# Patient Record
Sex: FEMALE | Race: WHITE | Employment: OTHER | URBAN - METROPOLITAN AREA
[De-identification: names, ages, dates, MRNs, and addresses within clinical notes are randomized per-mention and may not be internally consistent; named-entity substitution may affect disease eponyms.]

---

## 2017-02-08 ENCOUNTER — OFFICE VISIT (OUTPATIENT)
Dept: ENDOCRINOLOGY | Age: 67
End: 2017-02-08

## 2017-02-08 VITALS
HEIGHT: 63 IN | OXYGEN SATURATION: 97 % | SYSTOLIC BLOOD PRESSURE: 127 MMHG | DIASTOLIC BLOOD PRESSURE: 81 MMHG | WEIGHT: 103 LBS | HEART RATE: 82 BPM | BODY MASS INDEX: 18.25 KG/M2 | RESPIRATION RATE: 16 BRPM

## 2017-02-08 DIAGNOSIS — E78.00 PURE HYPERCHOLESTEROLEMIA: Primary | ICD-10-CM

## 2017-02-08 DIAGNOSIS — E55.9 VITAMIN D DEFICIENCY: ICD-10-CM

## 2017-02-08 PROCEDURE — 3017F COLORECTAL CA SCREEN DOC REV: CPT | Performed by: INTERNAL MEDICINE

## 2017-02-08 PROCEDURE — 1090F PRES/ABSN URINE INCON ASSESS: CPT | Performed by: INTERNAL MEDICINE

## 2017-02-08 PROCEDURE — G8419 CALC BMI OUT NRM PARAM NOF/U: HCPCS | Performed by: INTERNAL MEDICINE

## 2017-02-08 PROCEDURE — 99215 OFFICE O/P EST HI 40 MIN: CPT | Performed by: INTERNAL MEDICINE

## 2017-02-08 PROCEDURE — 1123F ACP DISCUSS/DSCN MKR DOCD: CPT | Performed by: INTERNAL MEDICINE

## 2017-02-08 PROCEDURE — G8400 PT W/DXA NO RESULTS DOC: HCPCS | Performed by: INTERNAL MEDICINE

## 2017-02-08 PROCEDURE — G8427 DOCREV CUR MEDS BY ELIG CLIN: HCPCS | Performed by: INTERNAL MEDICINE

## 2017-02-08 PROCEDURE — 1036F TOBACCO NON-USER: CPT | Performed by: INTERNAL MEDICINE

## 2017-02-08 PROCEDURE — 3014F SCREEN MAMMO DOC REV: CPT | Performed by: INTERNAL MEDICINE

## 2017-02-08 PROCEDURE — 4040F PNEUMOC VAC/ADMIN/RCVD: CPT | Performed by: INTERNAL MEDICINE

## 2017-02-08 PROCEDURE — G8484 FLU IMMUNIZE NO ADMIN: HCPCS | Performed by: INTERNAL MEDICINE

## 2017-02-08 RX ORDER — ERGOCALCIFEROL 1.25 MG/1
CAPSULE ORAL
Qty: 8 CAPSULE | Refills: 3 | Status: SHIPPED | OUTPATIENT
Start: 2017-02-08 | End: 2018-02-19 | Stop reason: SDUPTHER

## 2017-05-02 ENCOUNTER — TELEPHONE (OUTPATIENT)
Dept: ENDOCRINOLOGY | Age: 67
End: 2017-05-02

## 2017-05-02 RX ORDER — EZETIMIBE 10 MG/1
10 TABLET ORAL DAILY
Qty: 30 TABLET | Refills: 2 | Status: SHIPPED | OUTPATIENT
Start: 2017-05-02 | End: 2017-05-03 | Stop reason: SDUPTHER

## 2017-05-03 RX ORDER — EZETIMIBE 10 MG/1
10 TABLET ORAL DAILY
Qty: 90 TABLET | Refills: 0 | Status: SHIPPED | OUTPATIENT
Start: 2017-05-03 | End: 2017-05-25 | Stop reason: SDUPTHER

## 2017-05-25 RX ORDER — EZETIMIBE 10 MG/1
TABLET ORAL
Qty: 90 TABLET | Refills: 1 | Status: SHIPPED | OUTPATIENT
Start: 2017-05-25 | End: 2017-09-27 | Stop reason: SDUPTHER

## 2017-09-11 ENCOUNTER — OFFICE VISIT (OUTPATIENT)
Dept: URGENT CARE | Facility: CLINIC | Age: 67
End: 2017-09-11
Payer: MEDICARE

## 2017-09-11 VITALS
BODY MASS INDEX: 18.95 KG/M2 | HEIGHT: 62 IN | WEIGHT: 103 LBS | TEMPERATURE: 97 F | SYSTOLIC BLOOD PRESSURE: 115 MMHG | DIASTOLIC BLOOD PRESSURE: 76 MMHG | RESPIRATION RATE: 18 BRPM | OXYGEN SATURATION: 99 % | HEART RATE: 87 BPM

## 2017-09-11 DIAGNOSIS — J32.9 SINUSITIS, UNSPECIFIED CHRONICITY, UNSPECIFIED LOCATION: Primary | ICD-10-CM

## 2017-09-11 PROCEDURE — 99203 OFFICE O/P NEW LOW 30 MIN: CPT | Mod: 25,S$GLB,, | Performed by: PHYSICIAN ASSISTANT

## 2017-09-11 PROCEDURE — 96372 THER/PROPH/DIAG INJ SC/IM: CPT | Mod: S$GLB,,, | Performed by: PHYSICIAN ASSISTANT

## 2017-09-11 PROCEDURE — 1159F MED LIST DOCD IN RCRD: CPT | Mod: S$GLB,,, | Performed by: PHYSICIAN ASSISTANT

## 2017-09-11 RX ORDER — VALSARTAN 40 MG/1
40 TABLET ORAL DAILY
COMMUNITY

## 2017-09-11 RX ORDER — VITAMIN B COMPLEX
1 CAPSULE ORAL DAILY
COMMUNITY

## 2017-09-11 RX ORDER — ALENDRONATE SODIUM 70 MG/1
70 TABLET ORAL
COMMUNITY

## 2017-09-11 RX ORDER — LISDEXAMFETAMINE DIMESYLATE 40 MG/1
40 CAPSULE ORAL DAILY
COMMUNITY

## 2017-09-11 RX ORDER — CYCLOSPORINE 0.5 MG/ML
1 EMULSION OPHTHALMIC 2 TIMES DAILY
COMMUNITY

## 2017-09-11 RX ORDER — BETAMETHASONE SODIUM PHOSPHATE AND BETAMETHASONE ACETATE 3; 3 MG/ML; MG/ML
6 INJECTION, SUSPENSION INTRA-ARTICULAR; INTRALESIONAL; INTRAMUSCULAR; SOFT TISSUE
Status: COMPLETED | OUTPATIENT
Start: 2017-09-11 | End: 2017-09-11

## 2017-09-11 RX ORDER — AMOXICILLIN 500 MG
2 CAPSULE ORAL DAILY
COMMUNITY

## 2017-09-11 RX ORDER — ANASTROZOLE 1 MG/1
1 TABLET ORAL DAILY
COMMUNITY

## 2017-09-11 RX ORDER — CHOLECALCIFEROL (VITAMIN D3) 25 MCG
1000 TABLET ORAL DAILY
COMMUNITY

## 2017-09-11 RX ORDER — AMOXICILLIN 500 MG/1
500 CAPSULE ORAL 3 TIMES DAILY
Qty: 30 CAPSULE | Refills: 0 | Status: SHIPPED | OUTPATIENT
Start: 2017-09-11 | End: 2017-09-21

## 2017-09-11 RX ORDER — AMOXICILLIN 875 MG/1
875 TABLET, FILM COATED ORAL 2 TIMES DAILY
Qty: 20 TABLET | Refills: 0 | Status: SHIPPED | OUTPATIENT
Start: 2017-09-11 | End: 2017-09-11 | Stop reason: CLARIF

## 2017-09-11 RX ORDER — CLONAZEPAM 1 MG/1
1 TABLET ORAL 2 TIMES DAILY PRN
COMMUNITY

## 2017-09-11 RX ORDER — EZETIMIBE 10 MG/1
10 TABLET ORAL DAILY
COMMUNITY

## 2017-09-11 RX ORDER — FAMOTIDINE 10 MG/1
10 TABLET ORAL 2 TIMES DAILY
COMMUNITY

## 2017-09-11 RX ADMIN — BETAMETHASONE SODIUM PHOSPHATE AND BETAMETHASONE ACETATE 6 MG: 3; 3 INJECTION, SUSPENSION INTRA-ARTICULAR; INTRALESIONAL; INTRAMUSCULAR; SOFT TISSUE at 09:09

## 2017-09-11 NOTE — PROGRESS NOTES
"Subjective:       Patient ID: Rosie Mcclendon is a 66 y.o. female.    Vitals:  height is 5' 2" (1.575 m) and weight is 46.7 kg (103 lb). Her oral temperature is 97.4 °F (36.3 °C). Her blood pressure is 115/76 and her pulse is 87. Her respiration is 18 and oxygen saturation is 99%.     Chief Complaint: Nasal Congestion    Other   This is a new problem. The current episode started in the past 7 days. The problem occurs constantly. The problem has been gradually worsening. Associated symptoms include congestion, coughing, headaches and a sore throat. Pertinent negatives include no abdominal pain, chest pain, chills, fever, myalgias or nausea. Nothing aggravates the symptoms. Treatments tried: afrin, sudafed & otc cough syrup. The treatment provided no relief.     Review of Systems   Constitution: Negative for chills, fever and malaise/fatigue.   HENT: Positive for congestion and sore throat. Negative for hoarse voice.    Eyes: Negative for discharge and redness.   Cardiovascular: Negative for chest pain, dyspnea on exertion and leg swelling.   Respiratory: Positive for cough. Negative for shortness of breath, sputum production and wheezing.    Musculoskeletal: Negative for myalgias.   Gastrointestinal: Negative for abdominal pain and nausea.   Neurological: Positive for headaches.       Objective:      Physical Exam   Constitutional: She is oriented to person, place, and time. She appears well-developed and well-nourished. She is cooperative.  Non-toxic appearance. She does not appear ill. No distress.   HENT:   Head: Normocephalic and atraumatic.   Right Ear: Hearing, tympanic membrane, external ear and ear canal normal.   Left Ear: Hearing, tympanic membrane, external ear and ear canal normal.   Nose: Mucosal edema and rhinorrhea present. No nasal deformity. No epistaxis. Right sinus exhibits maxillary sinus tenderness. Right sinus exhibits no frontal sinus tenderness. Left sinus exhibits maxillary sinus tenderness. " Left sinus exhibits no frontal sinus tenderness.   Mouth/Throat: Uvula is midline and mucous membranes are normal. No trismus in the jaw. Normal dentition. No uvula swelling. Posterior oropharyngeal erythema present. Tonsils are 0 on the right. Tonsils are 0 on the left. No tonsillar exudate.   Eyes: Conjunctivae and lids are normal. No scleral icterus.   Sclera clear bilat   Neck: Trachea normal, full passive range of motion without pain and phonation normal. Neck supple.   Cardiovascular: Normal rate, regular rhythm, normal heart sounds, intact distal pulses and normal pulses.    Pulmonary/Chest: Effort normal and breath sounds normal. No respiratory distress.   Abdominal: Soft. Normal appearance and bowel sounds are normal. She exhibits no distension. There is no tenderness.   Musculoskeletal: Normal range of motion. She exhibits no edema or deformity.   Neurological: She is alert and oriented to person, place, and time. She exhibits normal muscle tone. Coordination normal.   Skin: Skin is warm, dry and intact. She is not diaphoretic. No pallor.   Psychiatric: She has a normal mood and affect. Her speech is normal and behavior is normal. Judgment and thought content normal. Cognition and memory are normal.   Nursing note and vitals reviewed.      Assessment:       1. Sinusitis, unspecified chronicity, unspecified location        Plan:         Sinusitis, unspecified chronicity, unspecified location  -     betamethasone acetate-betamethasone sodium phosphate injection 6 mg; Inject 1 mL (6 mg total) into the muscle one time.  -     Discontinue: amoxicillin (AMOXIL) 875 MG tablet; Take 1 tablet (875 mg total) by mouth 2 (two) times daily.  Dispense: 20 tablet; Refill: 0  -     amoxicillin (AMOXIL) 500 MG capsule; Take 1 capsule (500 mg total) by mouth 3 (three) times daily.  Dispense: 30 capsule; Refill: 0

## 2017-09-11 NOTE — PATIENT INSTRUCTIONS
Sinusitis (Antibiotic Treatment)    The sinuses are air-filled spaces within the bones of the face. They connect to the inside of the nose. Sinusitis is an inflammation of the tissue lining the sinus cavity. Sinus inflammation can occur during a cold. It can also be due to allergies to pollens and other particles in the air. Sinusitis can cause symptoms of sinus congestion and fullness. A sinus infection causes fever, headache and facial pain. There is often green or yellow drainage from the nose or into the back of the throat (post-nasal drip). You have been given antibiotics to treat this condition.  Home care:  · Take the full course of antibiotics as instructed. Do not stop taking them, even if you feel better.  · Drink plenty of water, hot tea, and other liquids. This may help thin mucus. It also may promote sinus drainage.  · Heat may help soothe painful areas of the face. Use a towel soaked in hot water. Or,  the shower and direct the hot spray onto your face. Using a vaporizer along with a menthol rub at night may also help.   · An expectorant containing guaifenesin may help thin the mucus and promote drainage from the sinuses.  · Over-the-counter decongestants may be used unless a similar medicine was prescribed. Nasal sprays work the fastest. Use one that contains phenylephrine or oxymetazoline. First blow the nose gently. Then use the spray. Do not use these medicines more often than directed on the label or symptoms may get worse. You may also use tablets containing pseudoephedrine. Avoid products that combine ingredients, because side effects may be increased. Read labels. You can also ask the pharmacist for help. (NOTE: Persons with high blood pressure should not use decongestants. They can raise blood pressure.)  · Over-the-counter antihistamines may help if allergies contributed to your sinusitis.    · Do not use nasal rinses or irrigation during an acute sinus infection, unless told to by  your health care provider. Rinsing may spread the infection to other sinuses.  · Use acetaminophen or ibuprofen to control pain, unless another pain medicine was prescribed. (If you have chronic liver or kidney disease or ever had a stomach ulcer, talk with your doctor before using these medicines. Aspirin should never be used in anyone under 18 years of age who is ill with a fever. It may cause severe liver damage.)  · Don't smoke. This can worsen symptoms.  Follow-up care  Follow up with your healthcare provider or our staff if you are not improving within the next week.  When to seek medical advice  Call your healthcare provider if any of these occur:  · Facial pain or headache becoming more severe  · Stiff neck  · Unusual drowsiness or confusion  · Swelling of the forehead or eyelids  · Vision problems, including blurred or double vision  · Fever of 100.4ºF (38ºC) or higher, or as directed by your healthcare provider  · Seizure  · Breathing problems  · Symptoms not resolving within 10 days  Date Last Reviewed: 4/13/2015  © 4217-0425 [employer]. [address] [place] [address]. All rights reserved. This information is not intended as a substitute for professional medical care. Always follow your healthcare professional's instructions.      Please follow up with your Primary care provider within 2-5 days if your signs ans symptoms have not resolved or worsen.     If your condition worsens or fails to improve we recommend that you receive another evaluation at the emergency room immediately or contact your primary medical clinic to discuss your concerns.   You must understand that you have received an Urgent Care treatment only and that you may be released before all of your medical problems are known or treated. You, the patient, will arrange for follow up care as instructed.

## 2017-09-27 RX ORDER — EZETIMIBE 10 MG/1
TABLET ORAL
Qty: 90 TABLET | Refills: 3 | Status: SHIPPED | OUTPATIENT
Start: 2017-09-27 | End: 2018-10-02 | Stop reason: SDUPTHER

## 2018-01-20 ENCOUNTER — OFFICE VISIT (OUTPATIENT)
Dept: URGENT CARE | Facility: CLINIC | Age: 68
End: 2018-01-20
Payer: MEDICARE

## 2018-01-20 VITALS
HEIGHT: 62 IN | OXYGEN SATURATION: 98 % | DIASTOLIC BLOOD PRESSURE: 84 MMHG | HEART RATE: 77 BPM | TEMPERATURE: 98 F | SYSTOLIC BLOOD PRESSURE: 131 MMHG | WEIGHT: 103 LBS | BODY MASS INDEX: 18.95 KG/M2 | RESPIRATION RATE: 18 BRPM

## 2018-01-20 DIAGNOSIS — J04.0 LARYNGITIS, ACUTE: ICD-10-CM

## 2018-01-20 DIAGNOSIS — R53.83 FATIGUE, UNSPECIFIED TYPE: Primary | ICD-10-CM

## 2018-01-20 DIAGNOSIS — B34.9 ACUTE VIRAL SYNDROME: ICD-10-CM

## 2018-01-20 LAB
CTP QC/QA: YES
FLUAV AG NPH QL: NEGATIVE
FLUBV AG NPH QL: NEGATIVE

## 2018-01-20 PROCEDURE — 99214 OFFICE O/P EST MOD 30 MIN: CPT | Mod: 25,S$GLB,, | Performed by: EMERGENCY MEDICINE

## 2018-01-20 PROCEDURE — 87804 INFLUENZA ASSAY W/OPTIC: CPT | Mod: 59,QW,S$GLB, | Performed by: EMERGENCY MEDICINE

## 2018-01-20 PROCEDURE — 96372 THER/PROPH/DIAG INJ SC/IM: CPT | Mod: S$GLB,,, | Performed by: EMERGENCY MEDICINE

## 2018-01-20 RX ORDER — BETAMETHASONE SODIUM PHOSPHATE AND BETAMETHASONE ACETATE 3; 3 MG/ML; MG/ML
6 INJECTION, SUSPENSION INTRA-ARTICULAR; INTRALESIONAL; INTRAMUSCULAR; SOFT TISSUE
Status: COMPLETED | OUTPATIENT
Start: 2018-01-20 | End: 2018-01-20

## 2018-01-20 RX ADMIN — BETAMETHASONE SODIUM PHOSPHATE AND BETAMETHASONE ACETATE 6 MG: 3; 3 INJECTION, SUSPENSION INTRA-ARTICULAR; INTRALESIONAL; INTRAMUSCULAR; SOFT TISSUE at 12:01

## 2018-01-20 NOTE — PROGRESS NOTES
Subjective:       Patient ID: Rosie Mcclendon is a 67 y.o. female.    Vitals:    01/20/18 1125   BP: 131/84   Pulse: 77   Resp: 18   Temp: 97.9 °F (36.6 °C)       Chief Complaint: Sore Throat and Fatigue    Sore Throat    This is a new problem. The current episode started yesterday. The problem has been gradually worsening. There has been no fever. The pain is at a severity of 3/10. The pain is mild. Associated symptoms include headaches, a hoarse voice, swollen glands and trouble swallowing. Pertinent negatives include no abdominal pain, congestion, coughing, ear pain or shortness of breath. She has had no exposure to strep or mono.   Fatigue   Associated symptoms include chills, fatigue, a fever, headaches, a sore throat and swollen glands. Pertinent negatives include no abdominal pain, chest pain, congestion, coughing, myalgias or nausea.     Review of Systems   Constitution: Positive for chills, fatigue and fever. Negative for malaise/fatigue.   HENT: Positive for hoarse voice, sore throat and trouble swallowing. Negative for congestion and ear pain.    Eyes: Negative for discharge and redness.   Cardiovascular: Negative for chest pain, dyspnea on exertion and leg swelling.   Respiratory: Negative for cough, shortness of breath, sputum production and wheezing.    Musculoskeletal: Negative for myalgias.   Gastrointestinal: Negative for abdominal pain and nausea.   Neurological: Positive for headaches.       Objective:      Physical Exam   Constitutional: She is oriented to person, place, and time. She appears well-developed and well-nourished. She is cooperative.  Non-toxic appearance. She does not appear ill. No distress.   HENT:   Head: Normocephalic and atraumatic.   Right Ear: Hearing, tympanic membrane, external ear and ear canal normal.   Left Ear: Hearing, tympanic membrane, external ear and ear canal normal.   Nose: Nose normal. No mucosal edema, rhinorrhea or nasal deformity. No epistaxis. Right sinus  exhibits no maxillary sinus tenderness and no frontal sinus tenderness. Left sinus exhibits no maxillary sinus tenderness and no frontal sinus tenderness.   Mouth/Throat: Uvula is midline and mucous membranes are normal. No trismus in the jaw. Normal dentition. No uvula swelling. No posterior oropharyngeal erythema.   MILD ERYTHEMA  HOARSE VOICE   Eyes: Conjunctivae and lids are normal.   Sclera clear bilat   Neck: Trachea normal, full passive range of motion without pain and phonation normal. Neck supple.   Cardiovascular: Normal rate, regular rhythm, normal heart sounds, intact distal pulses and normal pulses.    Pulmonary/Chest: Effort normal and breath sounds normal. No respiratory distress.   Abdominal: Soft. Normal appearance and bowel sounds are normal. There is no tenderness.   Musculoskeletal: Normal range of motion. She exhibits no edema or deformity.   Neurological: She is alert and oriented to person, place, and time. She exhibits normal muscle tone.   Skin: Skin is warm, dry and intact. She is not diaphoretic. No pallor.   Psychiatric: She has a normal mood and affect. Her speech is normal and behavior is normal. Cognition and memory are normal.   Nursing note and vitals reviewed.        Office Visit on 01/20/2018   Component Date Value Ref Range Status    Rapid Influenza A Ag 01/20/2018 Negative  Negative Final    Rapid Influenza B Ag 01/20/2018 Negative  Negative Final     Acceptable 01/20/2018 Yes   Final       Assessment:       1. Fatigue, unspecified type    2. Acute viral syndrome    3. Laryngitis, acute        Plan:       Rosie was seen today for sore throat and fatigue.    Diagnoses and all orders for this visit:    Fatigue, unspecified type  -     POCT Influenza A/B    Acute viral syndrome    Laryngitis, acute    Other orders  -     betamethasone acetate-betamethasone sodium phosphate injection 6 mg; Inject 1 mL (6 mg total) into the muscle one time.      Patient Instructions  "  SEE LARYNGITIS/WEAKNESS/VIRAL SYNDROME SHEET  RETURN FOR ANY CONCERNS OR PROBLEMS  1 CC CELESTONE GIVEN IN CLINIC  FLU SWAB NEGATIVE  OVER THE COUNTER CLARITIN  OVER THE COUNTER MOTRIN OR TYLENOL FOR BODY ACHES/SORE THROAT/HEADACHE/BODY ACHES, OR ANY OTHER CONCERNS OR PROBLEMS      Viral Syndrome (Adult)  A viral illness may cause a number of symptoms. The symptoms depend on the part of the body that the virus affects. If it settles in your nose, throat, and lungs, it may cause cough, sore throat, congestion, and sometimes headache. If it settles in your stomach and intestinal tract, it may cause vomiting and diarrhea. Sometimes it causes vague symptoms like "aching all over," feeling tired, loss of appetite, or fever.  A viral illness usually lasts 1 to 2 weeks, but sometimes it lasts longer. In some cases, a more serious infection can look like a viral syndrome in the first few days of the illness. You may need another exam and additional tests to know the difference. Watch for the warning signs listed below.  Home care  Follow these guidelines for taking care of yourself at home:  · If symptoms are severe, rest at home for the first 2 to 3 days.  · Stay away from cigarette smoke - both your smoke and the smoke from others.  · You may use over-the-counter acetaminophen or ibuprofen for fever, muscle aching, and headache, unless another medicine was prescribed for this. If you have chronic liver or kidney disease or ever had a stomach ulcer or GI bleeding, talk with your doctor before using these medicines. No one who is younger than 18 and ill with a fever should take aspirin. It may cause severe disease or death.  · Your appetite may be poor, so a light diet is fine. Avoid dehydration by drinking 8 to 12 8-ounce glasses of fluids each day. This may include water; orange juice; lemonade; apple, grape, and cranberry juice; clear fruit drinks; electrolyte replacement and sports drinks; and decaffeinated teas and " coffee. If you have been diagnosed with a kidney disease, ask your doctor how much and what types of fluids you should drink to prevent dehydration. If you have kidney disease, drinking too much fluid can cause it build up in the your body and be dangerous to your health.  · Over-the-counter remedies won't shorten the length of the illness but may be helpful for cough, sore throat; and nasal and sinus congestion. Don't use decongestants if you have high blood pressure.  Follow-up care  Follow up with your healthcare provider if you do not improve over the next week.  Call 911  Get emergency medical care if any of the following occur:  · Convulsion  · Feeling weak, dizzy, or like you are going to faint  · Chest pain, shortness of breath, wheezing, or difficulty breathing  When to seek medical advice  Call your healthcare provider right away if any of these occur:  · Cough with lots of colored sputum (mucus) or blood in your sputum  · Chest pain, shortness of breath, wheezing, or difficulty breathing  · Severe headache; face, neck, or ear pain  · Severe, constant pain in the lower right side of your belly (abdominal)  · Continued vomiting (cant keep liquids down)  · Frequent diarrhea (more than 5 times a day); blood (red or black color) or mucus in diarrhea  · Feeling weak, dizzy, or like you are going to faint  · Extreme thirst  · Fever of 100.4°F (38°C) or higher, or as directed by your healthcare provider  Date Last Reviewed: 9/25/2015  © 8023-8168 [employer]. [address] [place] [address], [place] [address]. All rights reserved. This information is not intended as a substitute for professional medical care. Always follow your healthcare professional's instructions.        Laryngitis    Laryngitis is a swelling of the tissues around the vocal cords. Symptoms include a hoarse (scratchy) voice. The voice may be lost completely. It may be caused by a viral illness, such as a head or chest cold. It may also be  due to overuse and strain of the voice. Smoking, drinking alcohol, acid reflux, allergies, or inhaling harsh chemicals may also lead to symptoms. This condition will usually resolve in 1-2 weeks.  Home care  · Rest your voice until it recovers. Talk as little as possible. If your symptoms are severe, rest at home for a day or so.  · Breathing cool steam from a humidifier/vaporizer or in a steamy shower may be helpful.  · Drink plenty of fluids to stay well hydrated.  · Do not smoke  Follow-up care  Follow up with your healthcare provider or this facility if you have not improved after one week.  When to seek medical advice  Contact your healthcare provider for any of the following:  · Severe pain with swallowing  · Trouble opening mouth  · Neck swelling, neck pain, or trouble moving neck  · Noisy breathing or trouble breathing  · Fever of 100.4°F (38.ºC) or higher, or as directed by your healthcare provider  · Drooling  · Symptoms do not resolve in 2 weeks  Date Last Reviewed: 4/26/2015  © 8342-3802 [employer]. [address] [place] [address], [place] [address]. All rights reserved. This information is not intended as a substitute for professional medical care. Always follow your healthcare professional's instructions.

## 2018-01-20 NOTE — PATIENT INSTRUCTIONS
"SEE LARYNGITIS/WEAKNESS/VIRAL SYNDROME SHEET  RETURN FOR ANY CONCERNS OR PROBLEMS  1 CC CELESTONE GIVEN IN CLINIC  FLU SWAB NEGATIVE  OVER THE COUNTER CLARITIN  OVER THE COUNTER MOTRIN OR TYLENOL FOR BODY ACHES/SORE THROAT/HEADACHE/BODY ACHES, OR ANY OTHER CONCERNS OR PROBLEMS      Viral Syndrome (Adult)  A viral illness may cause a number of symptoms. The symptoms depend on the part of the body that the virus affects. If it settles in your nose, throat, and lungs, it may cause cough, sore throat, congestion, and sometimes headache. If it settles in your stomach and intestinal tract, it may cause vomiting and diarrhea. Sometimes it causes vague symptoms like "aching all over," feeling tired, loss of appetite, or fever.  A viral illness usually lasts 1 to 2 weeks, but sometimes it lasts longer. In some cases, a more serious infection can look like a viral syndrome in the first few days of the illness. You may need another exam and additional tests to know the difference. Watch for the warning signs listed below.  Home care  Follow these guidelines for taking care of yourself at home:  · If symptoms are severe, rest at home for the first 2 to 3 days.  · Stay away from cigarette smoke - both your smoke and the smoke from others.  · You may use over-the-counter acetaminophen or ibuprofen for fever, muscle aching, and headache, unless another medicine was prescribed for this. If you have chronic liver or kidney disease or ever had a stomach ulcer or GI bleeding, talk with your doctor before using these medicines. No one who is younger than 18 and ill with a fever should take aspirin. It may cause severe disease or death.  · Your appetite may be poor, so a light diet is fine. Avoid dehydration by drinking 8 to 12 8-ounce glasses of fluids each day. This may include water; orange juice; lemonade; apple, grape, and cranberry juice; clear fruit drinks; electrolyte replacement and sports drinks; and decaffeinated teas and " coffee. If you have been diagnosed with a kidney disease, ask your doctor how much and what types of fluids you should drink to prevent dehydration. If you have kidney disease, drinking too much fluid can cause it build up in the your body and be dangerous to your health.  · Over-the-counter remedies won't shorten the length of the illness but may be helpful for cough, sore throat; and nasal and sinus congestion. Don't use decongestants if you have high blood pressure.  Follow-up care  Follow up with your healthcare provider if you do not improve over the next week.  Call 911  Get emergency medical care if any of the following occur:  · Convulsion  · Feeling weak, dizzy, or like you are going to faint  · Chest pain, shortness of breath, wheezing, or difficulty breathing  When to seek medical advice  Call your healthcare provider right away if any of these occur:  · Cough with lots of colored sputum (mucus) or blood in your sputum  · Chest pain, shortness of breath, wheezing, or difficulty breathing  · Severe headache; face, neck, or ear pain  · Severe, constant pain in the lower right side of your belly (abdominal)  · Continued vomiting (cant keep liquids down)  · Frequent diarrhea (more than 5 times a day); blood (red or black color) or mucus in diarrhea  · Feeling weak, dizzy, or like you are going to faint  · Extreme thirst  · Fever of 100.4°F (38°C) or higher, or as directed by your healthcare provider  Date Last Reviewed: 9/25/2015  © 4063-1377 [employer]. [address] [place] [address], [place] [address]. All rights reserved. This information is not intended as a substitute for professional medical care. Always follow your healthcare professional's instructions.        Laryngitis    Laryngitis is a swelling of the tissues around the vocal cords. Symptoms include a hoarse (scratchy) voice. The voice may be lost completely. It may be caused by a viral illness, such as a head or chest cold. It may also be  due to overuse and strain of the voice. Smoking, drinking alcohol, acid reflux, allergies, or inhaling harsh chemicals may also lead to symptoms. This condition will usually resolve in 1-2 weeks.  Home care  · Rest your voice until it recovers. Talk as little as possible. If your symptoms are severe, rest at home for a day or so.  · Breathing cool steam from a humidifier/vaporizer or in a steamy shower may be helpful.  · Drink plenty of fluids to stay well hydrated.  · Do not smoke  Follow-up care  Follow up with your healthcare provider or this facility if you have not improved after one week.  When to seek medical advice  Contact your healthcare provider for any of the following:  · Severe pain with swallowing  · Trouble opening mouth  · Neck swelling, neck pain, or trouble moving neck  · Noisy breathing or trouble breathing  · Fever of 100.4°F (38.ºC) or higher, or as directed by your healthcare provider  · Drooling  · Symptoms do not resolve in 2 weeks  Date Last Reviewed: 4/26/2015  © 7514-2035 [employer]. [address] [place] [address], [place] [address]. All rights reserved. This information is not intended as a substitute for professional medical care. Always follow your healthcare professional's instructions.

## 2018-02-05 DIAGNOSIS — E55.9 VITAMIN D DEFICIENCY: ICD-10-CM

## 2018-02-05 DIAGNOSIS — E78.00 PURE HYPERCHOLESTEROLEMIA: ICD-10-CM

## 2018-02-05 LAB
ANION GAP SERPL CALCULATED.3IONS-SCNC: 14 MMOL/L (ref 3–16)
BUN BLDV-MCNC: 13 MG/DL (ref 7–20)
CALCIUM SERPL-MCNC: 8.8 MG/DL (ref 8.3–10.6)
CHLORIDE BLD-SCNC: 102 MMOL/L (ref 99–110)
CHOLESTEROL, TOTAL: 179 MG/DL (ref 0–199)
CO2: 28 MMOL/L (ref 21–32)
CREAT SERPL-MCNC: 0.7 MG/DL (ref 0.6–1.2)
GFR AFRICAN AMERICAN: >60
GFR NON-AFRICAN AMERICAN: >60
GLUCOSE BLD-MCNC: 80 MG/DL (ref 70–99)
HDLC SERPL-MCNC: 112 MG/DL (ref 40–60)
LDL CHOLESTEROL CALCULATED: 61 MG/DL
POTASSIUM SERPL-SCNC: 3.7 MMOL/L (ref 3.5–5.1)
SODIUM BLD-SCNC: 144 MMOL/L (ref 136–145)
TRIGL SERPL-MCNC: 31 MG/DL (ref 0–150)
VITAMIN D 25-HYDROXY: 42.4 NG/ML
VLDLC SERPL CALC-MCNC: 6 MG/DL

## 2018-02-19 ENCOUNTER — OFFICE VISIT (OUTPATIENT)
Dept: ENDOCRINOLOGY | Age: 68
End: 2018-02-19

## 2018-02-19 VITALS
HEART RATE: 85 BPM | BODY MASS INDEX: 18.5 KG/M2 | WEIGHT: 104.4 LBS | OXYGEN SATURATION: 96 % | SYSTOLIC BLOOD PRESSURE: 131 MMHG | RESPIRATION RATE: 16 BRPM | DIASTOLIC BLOOD PRESSURE: 79 MMHG | HEIGHT: 63 IN

## 2018-02-19 DIAGNOSIS — E78.49 OTHER HYPERLIPIDEMIA: Primary | ICD-10-CM

## 2018-02-19 DIAGNOSIS — E55.9 VITAMIN D DEFICIENCY: ICD-10-CM

## 2018-02-19 DIAGNOSIS — R73.03 PREDIABETES: ICD-10-CM

## 2018-02-19 PROCEDURE — 1036F TOBACCO NON-USER: CPT | Performed by: INTERNAL MEDICINE

## 2018-02-19 PROCEDURE — 3017F COLORECTAL CA SCREEN DOC REV: CPT | Performed by: INTERNAL MEDICINE

## 2018-02-19 PROCEDURE — 99213 OFFICE O/P EST LOW 20 MIN: CPT | Performed by: INTERNAL MEDICINE

## 2018-02-19 PROCEDURE — 1123F ACP DISCUSS/DSCN MKR DOCD: CPT | Performed by: INTERNAL MEDICINE

## 2018-02-19 PROCEDURE — 4040F PNEUMOC VAC/ADMIN/RCVD: CPT | Performed by: INTERNAL MEDICINE

## 2018-02-19 PROCEDURE — G8484 FLU IMMUNIZE NO ADMIN: HCPCS | Performed by: INTERNAL MEDICINE

## 2018-02-19 PROCEDURE — G8420 CALC BMI NORM PARAMETERS: HCPCS | Performed by: INTERNAL MEDICINE

## 2018-02-19 PROCEDURE — 1090F PRES/ABSN URINE INCON ASSESS: CPT | Performed by: INTERNAL MEDICINE

## 2018-02-19 PROCEDURE — G8400 PT W/DXA NO RESULTS DOC: HCPCS | Performed by: INTERNAL MEDICINE

## 2018-02-19 PROCEDURE — 3014F SCREEN MAMMO DOC REV: CPT | Performed by: INTERNAL MEDICINE

## 2018-02-19 PROCEDURE — G8427 DOCREV CUR MEDS BY ELIG CLIN: HCPCS | Performed by: INTERNAL MEDICINE

## 2018-02-19 RX ORDER — SUVOREXANT 10 MG/1
TABLET, FILM COATED ORAL
COMMUNITY
Start: 2018-01-06

## 2018-02-19 RX ORDER — ERGOCALCIFEROL 1.25 MG/1
CAPSULE ORAL
Qty: 8 CAPSULE | Refills: 3 | Status: SHIPPED | OUTPATIENT
Start: 2018-02-19 | End: 2019-03-26 | Stop reason: SDUPTHER

## 2018-10-02 RX ORDER — EZETIMIBE 10 MG/1
TABLET ORAL
Qty: 90 TABLET | Refills: 0 | Status: SHIPPED | OUTPATIENT
Start: 2018-10-02 | End: 2019-01-06 | Stop reason: SDUPTHER

## 2018-11-12 ENCOUNTER — OFFICE VISIT (OUTPATIENT)
Dept: ORTHOPEDIC SURGERY | Age: 68
End: 2018-11-12
Payer: MEDICARE

## 2018-11-12 VITALS — HEIGHT: 63 IN | BODY MASS INDEX: 18.43 KG/M2 | WEIGHT: 104 LBS

## 2018-11-12 DIAGNOSIS — M79.644 FINGER PAIN, RIGHT: Primary | ICD-10-CM

## 2018-11-12 DIAGNOSIS — R22.31 FINGER MASS, RIGHT: ICD-10-CM

## 2018-11-12 PROCEDURE — 99202 OFFICE O/P NEW SF 15 MIN: CPT | Performed by: ORTHOPAEDIC SURGERY

## 2018-11-12 PROCEDURE — 3017F COLORECTAL CA SCREEN DOC REV: CPT | Performed by: ORTHOPAEDIC SURGERY

## 2018-11-12 PROCEDURE — G8420 CALC BMI NORM PARAMETERS: HCPCS | Performed by: ORTHOPAEDIC SURGERY

## 2018-11-12 PROCEDURE — G8484 FLU IMMUNIZE NO ADMIN: HCPCS | Performed by: ORTHOPAEDIC SURGERY

## 2018-11-12 PROCEDURE — 1090F PRES/ABSN URINE INCON ASSESS: CPT | Performed by: ORTHOPAEDIC SURGERY

## 2018-11-12 PROCEDURE — G8428 CUR MEDS NOT DOCUMENT: HCPCS | Performed by: ORTHOPAEDIC SURGERY

## 2018-11-12 PROCEDURE — 1101F PT FALLS ASSESS-DOCD LE1/YR: CPT | Performed by: ORTHOPAEDIC SURGERY

## 2018-11-12 NOTE — PROGRESS NOTES
mobile transversely and slightly mobile longitudinally. Neurologic exam shows negative Tinel sign over the mass. Vascular exam shows normal capillary refill. 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 The PIP joint has mild ulnar compartment joint space narrowing small bone spur formation seen on the oblique view but no other severe pathology  Additional Diagnostic Test Findings:    Office Procedures: We discussed this and benefits of operative excision and if this starts bothering her she is going to come back to the office to schedule        This dictation was performed with a verbal recognition program. It is possible that there are still dictated errors within this office note. All efforts were made to ensure that this office note is accurate.     Orders Placed This Encounter   Procedures    XR FINGER RIGHT (MIN 2 VIEWS)

## 2019-01-07 RX ORDER — EZETIMIBE 10 MG/1
TABLET ORAL
Qty: 90 TABLET | Refills: 1 | Status: SHIPPED | OUTPATIENT
Start: 2019-01-07 | End: 2019-07-09 | Stop reason: SDUPTHER

## 2019-02-13 DIAGNOSIS — E55.9 VITAMIN D DEFICIENCY: ICD-10-CM

## 2019-02-13 DIAGNOSIS — E78.49 OTHER HYPERLIPIDEMIA: ICD-10-CM

## 2019-02-13 DIAGNOSIS — R73.03 PREDIABETES: ICD-10-CM

## 2019-02-13 LAB
CHOLESTEROL, TOTAL: 167 MG/DL (ref 0–199)
HDLC SERPL-MCNC: 97 MG/DL (ref 40–60)
LDL CHOLESTEROL CALCULATED: 61 MG/DL
TRIGL SERPL-MCNC: 47 MG/DL (ref 0–150)
VITAMIN D 25-HYDROXY: 47.2 NG/ML
VLDLC SERPL CALC-MCNC: 9 MG/DL

## 2019-02-14 LAB
ESTIMATED AVERAGE GLUCOSE: 114 MG/DL
HBA1C MFR BLD: 5.6 %

## 2019-02-19 ENCOUNTER — OFFICE VISIT (OUTPATIENT)
Dept: ENDOCRINOLOGY | Age: 69
End: 2019-02-19
Payer: MEDICARE

## 2019-02-19 VITALS
BODY MASS INDEX: 18.96 KG/M2 | WEIGHT: 107 LBS | HEART RATE: 86 BPM | RESPIRATION RATE: 16 BRPM | DIASTOLIC BLOOD PRESSURE: 76 MMHG | OXYGEN SATURATION: 100 % | SYSTOLIC BLOOD PRESSURE: 119 MMHG | HEIGHT: 63 IN

## 2019-02-19 DIAGNOSIS — E55.9 VITAMIN D DEFICIENCY: ICD-10-CM

## 2019-02-19 DIAGNOSIS — E78.49 OTHER HYPERLIPIDEMIA: Primary | ICD-10-CM

## 2019-02-19 PROCEDURE — 4040F PNEUMOC VAC/ADMIN/RCVD: CPT | Performed by: INTERNAL MEDICINE

## 2019-02-19 PROCEDURE — 1036F TOBACCO NON-USER: CPT | Performed by: INTERNAL MEDICINE

## 2019-02-19 PROCEDURE — G8420 CALC BMI NORM PARAMETERS: HCPCS | Performed by: INTERNAL MEDICINE

## 2019-02-19 PROCEDURE — 1101F PT FALLS ASSESS-DOCD LE1/YR: CPT | Performed by: INTERNAL MEDICINE

## 2019-02-19 PROCEDURE — 1123F ACP DISCUSS/DSCN MKR DOCD: CPT | Performed by: INTERNAL MEDICINE

## 2019-02-19 PROCEDURE — 3017F COLORECTAL CA SCREEN DOC REV: CPT | Performed by: INTERNAL MEDICINE

## 2019-02-19 PROCEDURE — G8484 FLU IMMUNIZE NO ADMIN: HCPCS | Performed by: INTERNAL MEDICINE

## 2019-02-19 PROCEDURE — G8400 PT W/DXA NO RESULTS DOC: HCPCS | Performed by: INTERNAL MEDICINE

## 2019-02-19 PROCEDURE — 1090F PRES/ABSN URINE INCON ASSESS: CPT | Performed by: INTERNAL MEDICINE

## 2019-02-19 PROCEDURE — G8427 DOCREV CUR MEDS BY ELIG CLIN: HCPCS | Performed by: INTERNAL MEDICINE

## 2019-02-19 PROCEDURE — 99213 OFFICE O/P EST LOW 20 MIN: CPT | Performed by: INTERNAL MEDICINE

## 2019-03-26 ENCOUNTER — TELEPHONE (OUTPATIENT)
Dept: ENDOCRINOLOGY | Age: 69
End: 2019-03-26

## 2019-03-26 RX ORDER — ERGOCALCIFEROL 1.25 MG/1
CAPSULE ORAL
Qty: 8 CAPSULE | Refills: 3 | Status: SHIPPED | OUTPATIENT
Start: 2019-03-26 | End: 2019-04-08 | Stop reason: SDUPTHER

## 2019-04-08 RX ORDER — ERGOCALCIFEROL 1.25 MG/1
CAPSULE ORAL
Qty: 8 CAPSULE | Refills: 3 | Status: SHIPPED | OUTPATIENT
Start: 2019-04-08

## 2019-05-13 ENCOUNTER — TELEPHONE (OUTPATIENT)
Dept: ENDOCRINOLOGY | Age: 69
End: 2019-05-13

## 2019-05-13 NOTE — TELEPHONE ENCOUNTER
Looks like it was faxed to [facility] today, so she'll have to follow up with them.

## 2019-05-13 NOTE — TELEPHONE ENCOUNTER
Patient called in regards to her medical records she signed to have sent to he dr. Doris Nichols.  I gave her medical records number to follow up with that

## 2019-07-09 DIAGNOSIS — E78.49 OTHER HYPERLIPIDEMIA: Primary | ICD-10-CM

## 2019-07-09 RX ORDER — EZETIMIBE 10 MG/1
TABLET ORAL
Qty: 90 TABLET | Refills: 1 | Status: SHIPPED | OUTPATIENT
Start: 2019-07-09

## 2020-01-16 RX ORDER — ALIROCUMAB 150 MG/ML
INJECTION, SOLUTION SUBCUTANEOUS
Qty: 2 ML | Refills: 0 | Status: SHIPPED | OUTPATIENT
Start: 2020-01-16 | End: 2020-02-18

## 2020-02-18 RX ORDER — ALIROCUMAB 150 MG/ML
INJECTION, SOLUTION SUBCUTANEOUS
Qty: 2 ML | Refills: 0 | Status: SHIPPED | OUTPATIENT
Start: 2020-02-18 | End: 2020-04-13

## 2022-05-11 ENCOUNTER — HOSPITAL ENCOUNTER (EMERGENCY)
Age: 72
Discharge: HOME | End: 2022-05-11
Attending: EMERGENCY MEDICINE
Payer: MEDICARE

## 2022-05-11 VITALS
WEIGHT: 108.8 LBS | RESPIRATION RATE: 16 BRPM | HEART RATE: 88 BPM | HEIGHT: 62 IN | DIASTOLIC BLOOD PRESSURE: 83 MMHG | BODY MASS INDEX: 20.02 KG/M2 | TEMPERATURE: 97.9 F | OXYGEN SATURATION: 96 % | SYSTOLIC BLOOD PRESSURE: 146 MMHG

## 2022-05-11 DIAGNOSIS — T82.838S: Primary | ICD-10-CM

## 2022-05-11 PROCEDURE — 99282 EMERGENCY DEPT VISIT SF MDM: CPT

## 2022-05-12 NOTE — ED NOTES
Pt dc/d with instructions in stable condition, ambulatory to lobby. Home per ride.      Gini Severe, RN  05/11/22 [id number]

## 2022-05-12 NOTE — ED PROVIDER NOTES
[address]   eMERGENCY dEPARTMENT eNCOUnter      Pt Name: Flora Chacon  MRN: [id number]  [place] 1950  Date of evaluation: 5/11/2022  Provider: Christian Bowman MD  PCP: Dominique Hamilton MD      [address] [place] [address]       Chief Complaint   Patient presents with    Other     New pic line needs flushed       HISTORY OFPRESENT ILLNESS   (Location/Symptom, Timing/Onset, Context/Setting, Quality, Duration, Modifying Factors,Severity)  Note limiting factors. Flora Chacon is a 66 y.o. female PICC line placed at [facility] earlier today was sent home and told to flush her line which she was not taught how to do now in the late evening she comes to the emergency department because she wants us to flush her line she has the PICC line because she needs to get IV antibiotics for persistent pneumonia she is not having any shortness of breath or fever. No arm pain she was concerned because she opened the valve and then there was some backflow of blood and she did not know what to do and was afraid that it would clot    Nursing Notes were all reviewed and agreed with or any disagreements were addressed  in the HPI. REVIEW OF SYSTEMS    (2-9 systems for level 4, 10 or more for level 5)     Review of Systems    Positives and Pertinent negatives as per HPI. Except as noted above in the ROS, all other systems were reviewed andnegative.        PASTMEDICAL HISTORY     Past Medical History:   Diagnosis Date    Asthma     Hyperlipidemia     Hypertension          SURGICAL HISTORY       Past Surgical History:   Procedure Laterality Date    BREAST SURGERY           CURRENT MEDICATIONS       Discharge Medication List as of 5/11/2022  9:03 PM      CONTINUE these medications which have NOT CHANGED    Details   PRALUENT 150 MG/ML SOAJ INJECT 150MG SUBCUTANEOUSLY EVERY 2 WEEKS, Disp-2 mL, R-0Needs to be seen for more refillsNormal      PRALUENT 150 MG/ML SOPN injection pen INJECT 150MG SUBCUTANEOUSLY EVERY 2 WEEKS, Disp-2 mL, R-3Normal      ezetimibe (ZETIA) 10 MG tablet TAKE 1 TABLET BY MOUTH  DAILY, Disp-90 tablet, R-1Normal      vitamin D (ERGOCALCIFEROL) 41397 units CAPS capsule One capsule every other week, Disp-8 capsule, R-3Normal      Lisdexamfetamine Dimesylate 40 MG CAPS Take 10 mg by mouth. .Historical Med      BELSOMRA 10 MG TABS DAWHistorical Med      B Complex Vitamins (VITAMIN B COMPLEX PO) Take by mouth      Omega-3 Fatty Acids (FISH OIL PO) Take 3,000 Units by mouth      RESTASIS 0.05 % ophthalmic emulsion Historical Med      azelastine (ASTELIN) 0.1 % nasal spray Historical Med      montelukast (SINGULAIR) 10 MG tablet Take 10 mg by mouth nightly. fluticasone (VERAMYST) 27.5 MCG/SPRAY nasal spray 2 sprays by Nasal route daily. clonazePAM (KLONOPIN) 1 MG tablet Take 1 mg by mouth 2 times daily as needed for Anxiety. valsartan (DIOVAN) 40 MG tablet Take 40 mg by mouth daily. BiPAP Machine MISC Until Discontinued, Historical Med      Multiple Vitamins-Minerals (MULTIVITAMIN PO) Take 1 tablet by mouth daily. Probiotic Product (ALIGN PO) Take  by mouth. anastrozole (ARIMIDEX) 1 MG chemo tablet Take 1 mg by mouth daily. ALLERGIES     Remeron [mirtazapine], Iodine, Propofol, Statins, and Sulfa antibiotics    FAMILY HISTORY     History reviewed. No pertinent family history. SOCIAL HISTORY       Social History     Socioeconomic History    Marital status:      Spouse name: None    Number of children: None    Years of education: None    Highest education level: None   Occupational History    None   Tobacco Use    Smoking status: Never Smoker    Smokeless tobacco: Never Used   Vaping Use    Vaping Use: Never used   Substance and Sexual Activity    Alcohol use:  Yes    Drug use: No    Sexual activity: Not Currently   Other Topics Concern    None   Social History Narrative    None     Social Determinants of Health     Financial Resource Strain:     Difficulty of Paying Living Expenses: Not on file   Food Insecurity:     Worried About Running Out of Food in the Last Year: Not on file    Ron of Food in the Last Year: Not on file   Transportation Needs:     Lack of Transportation (Medical): Not on file    Lack of Transportation (Non-Medical): Not on file   Physical Activity:     Days of Exercise per Week: Not on file    Minutes of Exercise per Session: Not on file   Stress:     Feeling of Stress : Not on file   Social Connections:     Frequency of Communication with Friends and Family: Not on file    Frequency of Social Gatherings with Friends and Family: Not on file    Attends Latter day Services: Not on file    Active Member of [address] [place] [address] or Organizations: Not on file    Attends Club or Organization Meetings: Not on file    Marital Status: Not on file   Intimate Partner Violence:     Fear of Current or Ex-Partner: Not on file    Emotionally Abused: Not on file    Physically Abused: Not on file    Sexually Abused: Not on file   Housing Stability:     Unable to Pay for Housing in the Last Year: Not on file    Number of [place] in the Last Year: Not on file    Unstable Housing in the Last Year: Not on file       SCREENINGS      @FLOW([id number])@      PHYSICAL EXAM    (up to 7 for level 4, 8 or more for level 5)     ED Triage Vitals [05/11/22 2045]   BP Temp Temp Source Pulse Resp SpO2 Height Weight   (!) 171/83 97.9 °F (36.6 °C) Oral 88 16 96 % 5' 2\" (1.575 m) 108 lb 12.8 oz (49.4 kg)       Physical Exam      General Appearance:  Alert, cooperative, no distress, appears stated age. Head:  Normocephalic, without obviousabnormality, atraumatic. Eyes:  conjunctiva/corneas clear, EOM's intact. Sclera anicteric. ENT: Mucous membranes moist.   Neck: Supple, symmetrical, trachea midline, no adenopathy. No jugular venous distention. Extremities: No edema, cords or calf tenderness.   Full range of motion. Pulses: 2+ and symmetric   Skin: Turgor is normal, no rashes or lesions. Neurologic: Alert and oriented X 3. No focal findings. Motor grossly normal.  Speech clear, no drift, CN III-XII grossly intact,        DIAGNOSTIC RESULTS   LABS:    Labs Reviewed - No data to display    All other labs were within normal range or not returned as of this dictation. EKG: All EKG's are interpreted by the Emergency Department Physician who eithersigns or Co-signs this chart in the absence of a cardiologist.        RADIOLOGY:   Non-plain film images such as CT, Ultrasound and MRI are read by the radiologist. Plain radiographic images are visualized by myself. *    Interpretation per the Radiologist below, if available at the time of this note:    No orders to display         PROCEDURES   Unless otherwise noted below, none     Procedures    *    CRITICAL CARE TIME   N/A      EMERGENCY DEPARTMENT COURSE and DIFFERENTIALDIAGNOSIS/MDM:   Vitals:    Vitals:    05/11/22 2045 05/11/22 2108   BP: (!) 171/83 (!) 146/83   Pulse: 88    Resp: 16    Temp: 97.9 °F (36.6 °C)    TempSrc: Oral    SpO2: 96%    Weight: 108 lb 12.8 oz (49.4 kg)    Height: 5' 2\" (1.575 m)        Patient was given thefollowing medications:  Medications - No data to display        The patient tolerated their visit well. The patient and / or the familywere informed of the results of any tests, a time was given to answer questions. FINAL IMPRESSION      1.  Bleeding from PICC line, sequela    The line was flushed by the nurse the patient was discharged and told to follow-up with her primary care doctor    DISPOSITION/PLAN   DISPOSITION Decision To Discharge 05/11/2022 09:01:05 PM      PATIENT REFERRED TO:  Erika Gtz MD  [address]  [phone]      As needed      DISCHARGE MEDICATIONS:  Discharge Medication List as of 5/11/2022  9:03 PM          DISCONTINUED MEDICATIONS:  Discharge Medication List as of 5/11/2022  9:03 PM                 (Please note that portions of this note were completed with a voice recognition program.  Efforts were made to edit the dictations but occasionally words are mis-transcribed.)    Jenna Nava MD (electronically signed)      Jenna Nava MD  05/12/22 0003